# Patient Record
Sex: MALE | Race: BLACK OR AFRICAN AMERICAN | NOT HISPANIC OR LATINO
[De-identification: names, ages, dates, MRNs, and addresses within clinical notes are randomized per-mention and may not be internally consistent; named-entity substitution may affect disease eponyms.]

---

## 2024-06-13 ENCOUNTER — DASHBOARD ENCOUNTERS (OUTPATIENT)
Age: 79
End: 2024-06-13

## 2024-06-13 ENCOUNTER — OFFICE VISIT (OUTPATIENT)
Dept: URBAN - METROPOLITAN AREA CLINIC 92 | Facility: CLINIC | Age: 79
End: 2024-06-13
Payer: OTHER GOVERNMENT

## 2024-06-13 VITALS
HEART RATE: 61 BPM | HEIGHT: 73 IN | DIASTOLIC BLOOD PRESSURE: 73 MMHG | BODY MASS INDEX: 23.99 KG/M2 | WEIGHT: 181 LBS | TEMPERATURE: 97.2 F | SYSTOLIC BLOOD PRESSURE: 155 MMHG

## 2024-06-13 DIAGNOSIS — D50.8 OTHER IRON DEFICIENCY ANEMIA: ICD-10-CM

## 2024-06-13 PROBLEM — 87522002: Status: ACTIVE | Noted: 2024-06-13

## 2024-06-13 PROCEDURE — 99203 OFFICE O/P NEW LOW 30 MIN: CPT

## 2024-06-13 PROCEDURE — 99243 OFF/OP CNSLTJ NEW/EST LOW 30: CPT

## 2024-06-13 RX ORDER — OMEPRAZOLE 20 MG/1
1 CAPSULE 30 MINUTES BEFORE MORNING MEAL CAPSULE, DELAYED RELEASE ORAL ONCE A DAY
Status: ACTIVE | COMMUNITY

## 2024-06-13 RX ORDER — CHLORTHALIDONE 25 MG/1
1 TABLET IN THE MORNING WITH FOOD TABLET ORAL
Status: ACTIVE | COMMUNITY

## 2024-06-13 RX ORDER — FINASTERIDE 5 MG/1
1 TABLET TABLET, FILM COATED ORAL ONCE A DAY
Status: ACTIVE | COMMUNITY

## 2024-06-13 RX ORDER — POLYETHYLENE GLYCOL-3350 AND ELECTROLYTES WITH FLAVOR PACK 240; 5.84; 2.98; 6.72; 22.72 G/278.26G; G/278.26G; G/278.26G; G/278.26G; G/278.26G
4000ML POWDER, FOR SOLUTION ORAL
Qty: 4000 MILLILITER | Refills: 0 | OUTPATIENT
Start: 2024-06-13 | End: 2024-06-14

## 2024-06-13 RX ORDER — ACYCLOVIR 400 MG/1
TAKE 1 TABLET BY MOUTH THREE TIMES DAILY AS NEEDED FOR OUTBREAK TABLET ORAL
Qty: 30 EACH | Refills: 0 | Status: ACTIVE | COMMUNITY

## 2024-06-13 RX ORDER — ONDANSETRON HYDROCHLORIDE 4 MG/1
2 TABLET TABLET, FILM COATED ORAL
Qty: 2 | Refills: 0 | OUTPATIENT
Start: 2024-06-13

## 2024-06-13 RX ORDER — ATORVASTATIN CALCIUM 20 MG/1
TAKE 1 TABLET BY MOUTH ONCE DAILY TABLET, FILM COATED ORAL
Qty: 90 EACH | Refills: 0 | Status: ACTIVE | COMMUNITY

## 2024-06-13 RX ORDER — FERROUS SULFATE TAB EC 324 MG (65 MG FE EQUIVALENT) 324 (65 FE) MG
1 TABLET TABLET DELAYED RESPONSE ORAL
Status: ACTIVE | COMMUNITY

## 2024-06-13 NOTE — HPI-TODAY'S VISIT:
This patient was referred by Dr[facility] Mackenzie Kol[facility] for an evaluation of anemia.  A copy of this will be sent to the referring provider.  Labs included with records - 11/13/23 Hgb 11.6. No iron studies included. Pt is currently on iron supplements.    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 Patient states he had a recent EGD. There is no family history of colon polyps or colon cancer. Patient denies a change in bowel habits, appetite, and weight. Saw BRBPR 6-8 months ago, none since then. Patient takes a stool softener occasionally. Patient denies abdominal pain, diarrhea, hematochezia, or melena. Patient admits to early satiety over the past two years. Denies other upper GI issues. Denies NSAID use. Patient denies any cardiac or lung issues. No pacemaker/defibrillator, No blood thinner, No home O2.  Initially placed on Eliquis due to "blood clots in stomach". This was stopped two months ago by Dr. Plaza.

## 2024-08-13 ENCOUNTER — OFFICE VISIT (OUTPATIENT)
Dept: URBAN - METROPOLITAN AREA SURGERY CENTER 16 | Facility: SURGERY CENTER | Age: 79
End: 2024-08-13

## 2024-08-27 ENCOUNTER — OFFICE VISIT (OUTPATIENT)
Dept: URBAN - METROPOLITAN AREA CLINIC 92 | Facility: CLINIC | Age: 79
End: 2024-08-27

## 2024-09-11 ENCOUNTER — CLAIMS CREATED FROM THE CLAIM WINDOW (OUTPATIENT)
Dept: URBAN - METROPOLITAN AREA SURGERY CENTER 16 | Facility: SURGERY CENTER | Age: 79
End: 2024-09-11
Payer: OTHER GOVERNMENT

## 2024-09-11 ENCOUNTER — TELEPHONE ENCOUNTER (OUTPATIENT)
Dept: URBAN - METROPOLITAN AREA CLINIC 92 | Facility: CLINIC | Age: 79
End: 2024-09-11

## 2024-09-11 DIAGNOSIS — K64.8 HEMORRHOIDS, INTERNAL: ICD-10-CM

## 2024-09-11 DIAGNOSIS — K57.30 DIVERTICULA, COLON: ICD-10-CM

## 2024-09-11 DIAGNOSIS — K62.5 RECTAL BLEEDING: ICD-10-CM

## 2024-09-11 DIAGNOSIS — D50.9 ANEMIA, IRON DEFICIENCY: ICD-10-CM

## 2024-09-11 DIAGNOSIS — D50.9 ANEMIA: ICD-10-CM

## 2024-09-11 PROCEDURE — 45378 DIAGNOSTIC COLONOSCOPY: CPT | Performed by: INTERNAL MEDICINE

## 2024-09-11 PROCEDURE — 00811 ANES LWR INTST NDSC NOS: CPT | Performed by: ANESTHESIOLOGY

## 2024-09-11 PROCEDURE — 00811 ANES LWR INTST NDSC NOS: CPT | Performed by: NURSE ANESTHETIST, CERTIFIED REGISTERED

## 2024-09-11 RX ORDER — OMEPRAZOLE 20 MG/1
1 CAPSULE 30 MINUTES BEFORE MORNING MEAL CAPSULE, DELAYED RELEASE ORAL ONCE A DAY
Status: ACTIVE | COMMUNITY

## 2024-09-11 RX ORDER — ONDANSETRON HYDROCHLORIDE 4 MG/1
2 TABLET TABLET, FILM COATED ORAL
Qty: 2 | Refills: 0 | Status: ACTIVE | COMMUNITY
Start: 2024-06-13

## 2024-09-11 RX ORDER — ACYCLOVIR 400 MG/1
TAKE 1 TABLET BY MOUTH THREE TIMES DAILY AS NEEDED FOR OUTBREAK TABLET ORAL
Qty: 30 EACH | Refills: 0 | Status: ACTIVE | COMMUNITY

## 2024-09-11 RX ORDER — FINASTERIDE 5 MG/1
1 TABLET TABLET, FILM COATED ORAL ONCE A DAY
Status: ACTIVE | COMMUNITY

## 2024-09-11 RX ORDER — FERROUS SULFATE TAB EC 324 MG (65 MG FE EQUIVALENT) 324 (65 FE) MG
1 TABLET TABLET DELAYED RESPONSE ORAL
Status: ACTIVE | COMMUNITY

## 2024-09-11 RX ORDER — CHLORTHALIDONE 25 MG/1
1 TABLET IN THE MORNING WITH FOOD TABLET ORAL
Status: ACTIVE | COMMUNITY

## 2024-09-11 RX ORDER — ATORVASTATIN CALCIUM 20 MG/1
TAKE 1 TABLET BY MOUTH ONCE DAILY TABLET, FILM COATED ORAL
Qty: 90 EACH | Refills: 0 | Status: ACTIVE | COMMUNITY

## 2024-09-18 ENCOUNTER — OFFICE VISIT (OUTPATIENT)
Dept: URBAN - METROPOLITAN AREA CLINIC 91 | Facility: CLINIC | Age: 79
End: 2024-09-18

## 2024-09-20 ENCOUNTER — TELEPHONE ENCOUNTER (OUTPATIENT)
Dept: URBAN - METROPOLITAN AREA CLINIC 98 | Facility: CLINIC | Age: 79
End: 2024-09-20

## 2024-10-17 ENCOUNTER — OFFICE VISIT (OUTPATIENT)
Dept: URBAN - METROPOLITAN AREA CLINIC 92 | Facility: CLINIC | Age: 79
End: 2024-10-17
Payer: OTHER GOVERNMENT

## 2024-10-17 VITALS
HEIGHT: 73 IN | DIASTOLIC BLOOD PRESSURE: 71 MMHG | HEART RATE: 70 BPM | TEMPERATURE: 97.3 F | SYSTOLIC BLOOD PRESSURE: 139 MMHG | BODY MASS INDEX: 24.52 KG/M2 | WEIGHT: 185 LBS

## 2024-10-17 DIAGNOSIS — D50.8 OTHER IRON DEFICIENCY ANEMIA: ICD-10-CM

## 2024-10-17 PROCEDURE — 99213 OFFICE O/P EST LOW 20 MIN: CPT

## 2024-10-17 RX ORDER — OMEPRAZOLE 20 MG/1
1 CAPSULE 30 MINUTES BEFORE MORNING MEAL CAPSULE, DELAYED RELEASE ORAL ONCE A DAY
Status: ACTIVE | COMMUNITY

## 2024-10-17 RX ORDER — CHLORTHALIDONE 25 MG/1
1 TABLET IN THE MORNING WITH FOOD TABLET ORAL
Status: ACTIVE | COMMUNITY

## 2024-10-17 RX ORDER — GABAPENTIN 300 MG/1
1 CAPSULE CAPSULE ORAL ONCE A DAY
Status: ACTIVE | COMMUNITY

## 2024-10-17 RX ORDER — ATORVASTATIN CALCIUM 20 MG/1
TAKE 1 TABLET BY MOUTH ONCE DAILY TABLET, FILM COATED ORAL
Qty: 90 EACH | Refills: 0 | Status: ACTIVE | COMMUNITY

## 2024-10-17 RX ORDER — FERROUS SULFATE TAB EC 324 MG (65 MG FE EQUIVALENT) 324 (65 FE) MG
1 TABLET TABLET DELAYED RESPONSE ORAL
Status: ACTIVE | COMMUNITY

## 2024-10-17 RX ORDER — ONDANSETRON HYDROCHLORIDE 4 MG/1
2 TABLET TABLET, FILM COATED ORAL
Qty: 2 | Refills: 0 | Status: ON HOLD | COMMUNITY
Start: 2024-06-13

## 2024-10-17 RX ORDER — ACYCLOVIR 400 MG/1
TAKE 1 TABLET BY MOUTH THREE TIMES DAILY AS NEEDED FOR OUTBREAK TABLET ORAL
Qty: 30 EACH | Refills: 0 | Status: ACTIVE | COMMUNITY

## 2024-10-17 RX ORDER — FINASTERIDE 5 MG/1
1 TABLET TABLET, FILM COATED ORAL ONCE A DAY
Status: ACTIVE | COMMUNITY

## 2024-10-17 NOTE — PHYSICAL EXAM GASTROINTESTINAL
Abdomen , soft, nontender, nondistended , no guarding or rigidity , no masses palpable , normal bowel sounds  Patient has history of foot but with very micro adenoma not symptomatic with the vomiting and nausea no check on the size of the mass for more than 2-3 years recommend MRI of the brain patient continued to follow-up with the Neurology periodically

## 2024-10-17 NOTE — HPI-TODAY'S VISIT:
This patient was referred by Dr[facility] Mackenzie Kolb [facility] who presents in follow up for anemia.  A copy of this will be sent to the referring provider.  Labs included with records - 11/13/23 Hgb 11.6. No iron studies included. Pt is currently on iron supplements.    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 Colonoscopy 9/11/24 with Dr. Driscoll revealed diverticulosis in entire examined colon, IH, no specimens collected, repeat not recommended due to age. PillCam 9/18/24 revealed small non-bleeding angioectasia in proximal small bowel.  6/13/24, Last colonoscopy two years ago normal per patient. Patient states he had a recent EGD. There is no family history of colon polyps or colon cancer. Patient denies a change in bowel habits, appetite, and weight. Saw BRBPR 6-8 months ago, none since then. Patient takes a stool softener occasionally. Patient denies abdominal pain, diarrhea, hematochezia, or melena. Patient admits to early satiety over the past two years. Denies other upper GI issues. Denies NSAID use. Patient denies any cardiac or lung issues. No pacemaker/defibrillator, No blood thinner, No home O2.  Initially placed on Eliquis due to "blood clots in stomach". This was stopped two months ago by Dr. Plaza.  Today, patient reports he feels fine. Denies upper GI issues. Denies current constipation, diarrhea, hematochezia, or melena. He notes his iron supplements caused constipation so now he takes them every 3-4 days.

## 2024-10-18 LAB
ABSOLUTE BASOPHILS: 21
ABSOLUTE EOSINOPHILS: 70
ABSOLUTE LYMPHOCYTES: 1365
ABSOLUTE MONOCYTES: 422
ABSOLUTE NEUTROPHILS: 2222
BASOPHILS: 0.5
EOSINOPHILS: 1.7
FERRITIN, SERUM: 51
HEMATOCRIT: 36.5
HEMOGLOBIN: 12.2
IRON BIND.CAP.(TIBC): 299
IRON SATURATION: 19
IRON: 57
LYMPHOCYTES: 33.3
MCH: 29.2
MCHC: 33.4
MCV: 87.3
MONOCYTES: 10.3
MPV: 10
NEUTROPHILS: 54.2
PLATELET COUNT: 179
RDW: 13
RED BLOOD CELL COUNT: 4.18
WHITE BLOOD CELL COUNT: 4.1

## 2024-12-19 ENCOUNTER — OFFICE VISIT (OUTPATIENT)
Dept: URBAN - METROPOLITAN AREA CLINIC 92 | Facility: CLINIC | Age: 79
End: 2024-12-19
Payer: OTHER GOVERNMENT

## 2024-12-19 VITALS
DIASTOLIC BLOOD PRESSURE: 64 MMHG | HEART RATE: 72 BPM | TEMPERATURE: 97.2 F | HEIGHT: 73 IN | WEIGHT: 177.9 LBS | SYSTOLIC BLOOD PRESSURE: 133 MMHG | BODY MASS INDEX: 23.58 KG/M2

## 2024-12-19 DIAGNOSIS — D50.9 IRON DEFICIENCY ANEMIA, UNSPECIFIED IRON DEFICIENCY ANEMIA TYPE: ICD-10-CM

## 2024-12-19 PROCEDURE — 99213 OFFICE O/P EST LOW 20 MIN: CPT

## 2024-12-19 RX ORDER — GABAPENTIN 300 MG/1
1 CAPSULE CAPSULE ORAL ONCE A DAY
Status: ACTIVE | COMMUNITY

## 2024-12-19 RX ORDER — FINASTERIDE 5 MG/1
1 TABLET TABLET, FILM COATED ORAL ONCE A DAY
Status: ACTIVE | COMMUNITY

## 2024-12-19 RX ORDER — FERROUS SULFATE TAB EC 324 MG (65 MG FE EQUIVALENT) 324 (65 FE) MG
1 TABLET TABLET DELAYED RESPONSE ORAL
Status: ACTIVE | COMMUNITY

## 2024-12-19 RX ORDER — ATORVASTATIN CALCIUM 20 MG/1
TAKE 1 TABLET BY MOUTH ONCE DAILY TABLET, FILM COATED ORAL
Qty: 90 EACH | Refills: 0 | Status: ACTIVE | COMMUNITY

## 2024-12-19 RX ORDER — ONDANSETRON HYDROCHLORIDE 4 MG/1
2 TABLET TABLET, FILM COATED ORAL
Qty: 2 | Refills: 0 | Status: ON HOLD | COMMUNITY
Start: 2024-06-13

## 2024-12-19 RX ORDER — CHLORTHALIDONE 25 MG/1
1 TABLET IN THE MORNING WITH FOOD TABLET ORAL
Status: ACTIVE | COMMUNITY

## 2024-12-19 RX ORDER — ACYCLOVIR 400 MG/1
TAKE 1 TABLET BY MOUTH THREE TIMES DAILY AS NEEDED FOR OUTBREAK TABLET ORAL
Qty: 30 EACH | Refills: 0 | Status: ACTIVE | COMMUNITY

## 2024-12-19 RX ORDER — OMEPRAZOLE 20 MG/1
1 CAPSULE 30 MINUTES BEFORE MORNING MEAL CAPSULE, DELAYED RELEASE ORAL ONCE A DAY
Status: ACTIVE | COMMUNITY

## 2024-12-19 NOTE — HPI-TODAY'S VISIT:
This patient was referred by Dr[facility] Mackenzie Kolb from [facility] who presents in follow up for anemia.  A copy of this will be sent to the referring provider.  Labs included with records - 11/13/23 Hgb 11.6. No iron studies included. Pt is currently on iron supplements.    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 Colonoscopy 9/11/24 with Dr. Driscoll revealed diverticulosis in entire examined colon, IH, no specimens collected, repeat not recommended due to age. PillCam 9/18/24 revealed small non-bleeding angioectasia in proximal small bowel.  6/13/24, Last colonoscopy two years ago normal per patient. Patient states he had a recent EGD. There is no family history of colon polyps or colon cancer. Patient denies a change in bowel habits, appetite, and weight. Saw BRBPR 6-8 months ago, none since then. Patient takes a stool softener occasionally. Patient denies abdominal pain, diarrhea, hematochezia, or melena. Patient admits to early satiety over the past two years. Denies other upper GI issues. Denies NSAID use. Patient denies any cardiac or lung issues. No pacemaker/defibrillator, No blood thinner, No home O2.  Initially placed on Eliquis due to "blood clots in stomach". This was stopped two months ago by Dr. Plaza.  Today, patient notes of a decreased appetite. Denies other upper GI issues. He has a BM every 2-3 days. Denies having to strain. Denies current diarrhea, hematochezia, or melena. He is c/w iron supplements.